# Patient Record
Sex: MALE | Race: WHITE
[De-identification: names, ages, dates, MRNs, and addresses within clinical notes are randomized per-mention and may not be internally consistent; named-entity substitution may affect disease eponyms.]

---
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
## 2022-09-26 ENCOUNTER — OFFICE VISIT (OUTPATIENT)
Dept: URGENT CARE | Facility: CLINIC | Age: 22
End: 2022-09-26
Payer: COMMERCIAL

## 2022-09-26 VITALS
OXYGEN SATURATION: 99 % | DIASTOLIC BLOOD PRESSURE: 82 MMHG | TEMPERATURE: 97.8 F | SYSTOLIC BLOOD PRESSURE: 122 MMHG | HEIGHT: 73 IN | WEIGHT: 195 LBS | BODY MASS INDEX: 25.84 KG/M2 | RESPIRATION RATE: 16 BRPM | HEART RATE: 70 BPM

## 2022-09-26 DIAGNOSIS — S51.011A LACERATION OF RIGHT ELBOW, INITIAL ENCOUNTER: ICD-10-CM

## 2022-09-26 PROCEDURE — 99203 OFFICE O/P NEW LOW 30 MIN: CPT | Performed by: PHYSICIAN ASSISTANT

## 2022-09-27 NOTE — PROGRESS NOTES
"  Subjective:   Caleb Trevino is a 21 y.o. male who presents today with   Chief Complaint   Patient presents with    Laceration     X this am, laceration on Rt. arm       Laceration   The incident occurred 6 to 12 hours ago. The laceration is located on the Right arm. The laceration is 1 cm in size. The laceration mechanism was a metal edge. The pain is mild. The pain has been Constant since onset. He reports no foreign bodies present. His tetanus status is UTD.   Tetanus shot last updated in 2019.  Patient states he was working with a tool at home and accidentally cut his right elbow.  He states he was able to clean the area of after it happened.  PMH:  has no past medical history on file.  MEDS:   Current Outpatient Medications:     promethazine (PHENERGAN) 25 MG Tab, Take 1 Tab by mouth every 6 hours as needed for Nausea/Vomiting., Disp: 30 Tab, Rfl: 0  ALLERGIES: No Known Allergies  SURGHX: History reviewed. No pertinent surgical history.  SOCHX:  reports that he has never smoked. He has never used smokeless tobacco. He reports that he does not drink alcohol and does not use drugs.  FH: Reviewed with patient, not pertinent to this visit.       Review of Systems   Skin:         Right elbow laceration      Objective:   /82 (BP Location: Left arm, Patient Position: Sitting, BP Cuff Size: Large adult)   Pulse 70   Temp 36.6 °C (97.8 °F) (Temporal)   Resp 16   Ht 1.854 m (6' 1\")   Wt 88.5 kg (195 lb)   SpO2 99%   BMI 25.73 kg/m²   Physical Exam  Vitals and nursing note reviewed.   Constitutional:       General: He is not in acute distress.     Appearance: Normal appearance. He is well-developed. He is not ill-appearing or toxic-appearing.   HENT:      Head: Normocephalic and atraumatic.      Right Ear: Hearing normal.      Left Ear: Hearing normal.   Cardiovascular:      Rate and Rhythm: Normal rate.   Pulmonary:      Effort: Pulmonary effort is normal.   Musculoskeletal:      Comments: Patient has " full range of motion and full strength against resistance of the right upper extremity at the elbow.  Neurovascular intact distally. 5/5  strength.   Skin:     General: Skin is warm and dry.             Comments: Superficial laceration to the medial aspect of the right elbow approximately 1 cm in length.  No foreign body and no drainage or discharge and no surrounding erythema.   Neurological:      Mental Status: He is alert.      Coordination: Coordination normal.   Psychiatric:         Mood and Affect: Mood normal.   Area was copiously irrigated and cleaned with Hibiclens and saline.  No foreign body appreciated.      Assessment/Plan:   Assessment    1. Laceration of right elbow, initial encounter  No sutures indicated at this time but we did help reapproximate the area very well with Steri-Strips.  Gave patient instructions on wound care.  Monitor for any signs of infection and follow-up if any occur and he is understanding.  Differential diagnosis, natural history, supportive care, and indications for immediate follow-up discussed.   Patient given instructions and understanding of medications and treatment.    If not improving in 3-5 days, F/U with PCP or return to  if symptoms worsen.    Patient agreeable to plan.    Please note that this dictation was created using voice recognition software. I have made every reasonable attempt to correct obvious errors, but I expect that there are errors of grammar and possibly content that I did not discover before finalizing the note.    Julian Hernandez PA-C